# Patient Record
Sex: MALE | Race: BLACK OR AFRICAN AMERICAN | Employment: FULL TIME | URBAN - METROPOLITAN AREA
[De-identification: names, ages, dates, MRNs, and addresses within clinical notes are randomized per-mention and may not be internally consistent; named-entity substitution may affect disease eponyms.]

---

## 2024-11-06 ENCOUNTER — HOSPITAL ENCOUNTER (EMERGENCY)
Age: 24
Discharge: HOME | End: 2024-11-06
Attending: STUDENT IN AN ORGANIZED HEALTH CARE EDUCATION/TRAINING PROGRAM
Payer: COMMERCIAL

## 2024-11-06 VITALS
HEIGHT: 69 IN | TEMPERATURE: 98.4 F | WEIGHT: 221.56 LBS | DIASTOLIC BLOOD PRESSURE: 80 MMHG | RESPIRATION RATE: 16 BRPM | HEART RATE: 80 BPM | BODY MASS INDEX: 32.82 KG/M2 | SYSTOLIC BLOOD PRESSURE: 146 MMHG | OXYGEN SATURATION: 100 %

## 2024-11-06 DIAGNOSIS — R31.9 HEMATURIA, UNSPECIFIED TYPE: Primary | ICD-10-CM

## 2024-11-06 DIAGNOSIS — R30.0 DYSURIA: ICD-10-CM

## 2024-11-06 LAB
BILIRUB UR QL STRIP.AUTO: NEGATIVE
CLARITY UR: CLEAR
COLOR UR: YELLOW
GLUCOSE UR STRIP.AUTO-MCNC: NEGATIVE MG/DL
HGB UR QL STRIP.AUTO: NEGATIVE
KETONES UR STRIP.AUTO-MCNC: NEGATIVE MG/DL
LEUKOCYTE ESTERASE UR QL STRIP.AUTO: NEGATIVE
NITRITE UR QL STRIP.AUTO: NEGATIVE
PH UR STRIP.AUTO: 5.5 [PH] (ref 5–8)
PROT UR STRIP.AUTO-MCNC: NEGATIVE MG/DL
SP GR UR STRIP.AUTO: 1.02 (ref 1–1.03)
UA COMPLETE W REFLEX CULTURE PNL UR: NORMAL
UA DIPSTICK W REFLEX MICRO PNL UR: NORMAL
URN SPEC COLLECT METH UR: NORMAL
UROBILINOGEN UR STRIP-ACNC: 1 E.U./DL

## 2024-11-06 PROCEDURE — 99283 EMERGENCY DEPT VISIT LOW MDM: CPT

## 2024-11-06 PROCEDURE — 81003 URINALYSIS AUTO W/O SCOPE: CPT

## 2024-11-06 ASSESSMENT — PAIN SCALES - GENERAL
PAINLEVEL_OUTOF10: 0
PAINLEVEL_OUTOF10: 0

## 2024-11-06 ASSESSMENT — PAIN - FUNCTIONAL ASSESSMENT
PAIN_FUNCTIONAL_ASSESSMENT: 0-10
PAIN_FUNCTIONAL_ASSESSMENT: NONE - DENIES PAIN

## 2024-11-06 NOTE — DISCHARGE INSTRUCTIONS
You were seen today in the emergency department due to blood in your urine.  Your urinalysis did not show any evidence of blood.  It is recommended that you follow-up with your primary doctor, if 1 is not available to you then a list has been provided below.  Please return to the emergency department you experience any further concerning symptoms.    [facility] Referral number [phone] for Primary Care      [facility] CL[facility]  [facility]  [address] [address]  [phone]  Fax [phone]  Medical, OB/Gyn, Pediatrics, [facility]  Serves all of [facility]  [address]  [place], [place] [address]  [phone]       [facility]  [address] (Administrative offices)  [phone]  [facility] ([place])  [address], [place], [place] [address] [phone] or [phone], [place] [phone],   Dental Appointments [phone] or [phone]  Pediatric, Family Practice, X-Ray  Serves all of [facility]  [address]  [place], [place] [address]  [phone]   [facility] ([facility])   [address]    (Located in [facility])  [phone] or [phone], [place] [phone],   Dental Appointments [phone] or [phone]     [facility]  [address], [place] [address]  [phone] [facility]  [address]  [phone]   [facility]  [address]  [address]  [phone] Fax [phone]  General Practice    Serves [place] and Surrounding areas [facility]  [address] [address]  [phone] Fax [phone]  Medical, OB/Gyn, Pediatrics  Dental Clinic, [facility] only     [facility]  [address] [id number] [phone] Fax [phone]  Family Practice, Pediatrics, OB/Gyn, [facility]  Dental

## 2024-11-06 NOTE — ED PROVIDER NOTES
blank)  Medications - No data to display      Responses to treatment:       PROCEDURES: (None if blank)      CRITICAL CARE: (None if blank)  I personally saw the patient and independently provided 0 minutes of nonconcurrent critical care out of the total shared critical care time provided     This includes multiple reevaluations, vital sign monitoring, pulse oximetry monitoring, telemetry monitoring, clinical response to the IV medications, reviewing the nursing notes, consultation time, dictation/documentation time, and interpretation of the labwork. (This time excludes time spent performing procedures).       DISCHARGE PRESCRIPTIONS: (None if blank)  New Prescriptions    No medications on file         I am the primary clinician of record.     FINAL IMPRESSION      1. Hematuria, unspecified type    2. Dysuria            DISPOSITION/PLAN   DISPOSITION             OUTPATIENT FOLLOW UP THE PATIENT:  No follow-up provider specified.      Electronically Signed: Eric Deleon DO, 11/06/24, 6:02 PM    This report has been produced using speech recognition software and may contain errors related to that system including errors in grammar, punctuation, and spelling, as well as words and phrases that may be inappropriate. If there are any questions or concerns please feel free to contact the dictating provider for clarification.       Eric Deleon DO  11/06/24 [id number]